# Patient Record
Sex: FEMALE | Race: WHITE | NOT HISPANIC OR LATINO | URBAN - NONMETROPOLITAN AREA
[De-identification: names, ages, dates, MRNs, and addresses within clinical notes are randomized per-mention and may not be internally consistent; named-entity substitution may affect disease eponyms.]

---

## 2020-07-20 ENCOUNTER — OFFICE VISIT (OUTPATIENT)
Dept: URBAN - NONMETROPOLITAN AREA CLINIC 2 | Facility: CLINIC | Age: 65
End: 2020-07-20
Payer: COMMERCIAL

## 2020-07-20 ENCOUNTER — LAB OUTSIDE AN ENCOUNTER (OUTPATIENT)
Dept: URBAN - NONMETROPOLITAN AREA CLINIC 2 | Facility: CLINIC | Age: 65
End: 2020-07-20

## 2020-07-20 ENCOUNTER — WEB ENCOUNTER (OUTPATIENT)
Dept: URBAN - NONMETROPOLITAN AREA CLINIC 2 | Facility: CLINIC | Age: 65
End: 2020-07-20

## 2020-07-20 DIAGNOSIS — Z12.11 ROUTINE COLON: ICD-10-CM

## 2020-07-20 DIAGNOSIS — K21.9 ESOPHAGEAL REFLUX: ICD-10-CM

## 2020-07-20 DIAGNOSIS — D50.9 IDA (IRON DEFICIENCY ANEMIA): ICD-10-CM

## 2020-07-20 PROCEDURE — 99204 OFFICE O/P NEW MOD 45 MIN: CPT | Performed by: NURSE PRACTITIONER

## 2020-07-20 RX ORDER — ESCITALOPRAM 20 MG/1
1 TABLET TABLET, FILM COATED ORAL ONCE A DAY
Status: ACTIVE | COMMUNITY

## 2020-07-20 RX ORDER — ROSUVASTATIN CALCIUM 10 MG/1
1 TABLET TABLET, FILM COATED ORAL QD
Refills: 0 | Status: ACTIVE | COMMUNITY
Start: 2015-10-02

## 2020-07-20 RX ORDER — CA/D3/MAG OX/ZINC/COP/MANG/BOR 600 MG-800
TAKE 1 CAPSULE BY ORAL ROUTE DAILY TABLET,CHEWABLE ORAL QD
Refills: 0 | Status: ACTIVE | COMMUNITY
Start: 2015-10-02

## 2020-07-20 RX ORDER — LISINOPRIL AND HYDROCHLOROTHIAZIDE 20; 12.5 MG/1; MG/1
1 TABLET TABLET ORAL ONCE A DAY
Status: ACTIVE | COMMUNITY

## 2020-07-20 RX ORDER — POLYETHYLENE GLYCOL 3350, SODIUM SULFATE, SODIUM CHLORIDE, POTASSIUM CHLORIDE, ASCORBIC ACID, SODIUM ASCORBATE 140-9-5.2G
AS DIRECTED KIT ORAL ONCE
Qty: 280 GRAM | Refills: 0 | OUTPATIENT
Start: 2020-07-20 | End: 2020-07-21

## 2020-07-20 RX ORDER — MELOXICAM 7.5 MG/1
1 TABLET TABLET ORAL PRN
Refills: 0 | Status: ACTIVE | COMMUNITY
Start: 2015-10-02

## 2020-07-20 NOTE — HPI-TODAY'S VISIT:
Mrs. Smith presents for evaluation of colon cancer screening and GERD. Her last EGD and Colonoscopy were in 2015 by Dr. Steve with reflux esophagitis and two SSA. She is due for repeat colonoscopy. She is on omeprazole 20mg daily and NSAIDs 2-3 times a week for arthritis. She wants to wean off but can't. She does have breakthrough reflux occasionally throughout the week. Today she is doing well otherwise. MB

## 2020-08-12 ENCOUNTER — OFFICE VISIT (OUTPATIENT)
Dept: URBAN - NONMETROPOLITAN AREA SURGERY CENTER 1 | Facility: SURGERY CENTER | Age: 65
End: 2020-08-12
Payer: COMMERCIAL

## 2020-08-12 DIAGNOSIS — D50.8 ANEMIA, DUE TO INADEQUATE IRON INTAKE: ICD-10-CM

## 2020-08-12 DIAGNOSIS — K22.8 COLUMNAR-LINED ESOPHAGUS: ICD-10-CM

## 2020-08-12 DIAGNOSIS — K44.9 DIAPHRAGMATIC HERNIA: ICD-10-CM

## 2020-08-12 DIAGNOSIS — K31.89 ACQUIRED DEFORMITY OF PYLORUS: ICD-10-CM

## 2020-08-12 PROCEDURE — 43239 EGD BIOPSY SINGLE/MULTIPLE: CPT | Performed by: INTERNAL MEDICINE

## 2020-08-12 PROCEDURE — G9937 DIG OR SURV COLSCO: HCPCS | Performed by: INTERNAL MEDICINE

## 2020-08-12 PROCEDURE — G8907 PT DOC NO EVENTS ON DISCHARG: HCPCS | Performed by: INTERNAL MEDICINE

## 2020-08-12 PROCEDURE — 45378 DIAGNOSTIC COLONOSCOPY: CPT | Performed by: INTERNAL MEDICINE

## 2020-08-26 ENCOUNTER — OFFICE VISIT (OUTPATIENT)
Dept: URBAN - METROPOLITAN AREA TELEHEALTH 2 | Facility: TELEHEALTH | Age: 65
End: 2020-08-26
Payer: COMMERCIAL

## 2020-08-26 DIAGNOSIS — Z12.11 ROUTINE COLON: ICD-10-CM

## 2020-08-26 DIAGNOSIS — D50.8 OTHER IRON DEFICIENCY ANEMIAS: ICD-10-CM

## 2020-08-26 DIAGNOSIS — K21.9 ESOPHAGEAL REFLUX: ICD-10-CM

## 2020-08-26 PROCEDURE — G8427 DOCREV CUR MEDS BY ELIG CLIN: HCPCS | Performed by: NURSE PRACTITIONER

## 2020-08-26 PROCEDURE — 3017F COLORECTAL CA SCREEN DOC REV: CPT | Performed by: NURSE PRACTITIONER

## 2020-08-26 PROCEDURE — G9903 PT SCRN TBCO ID AS NON USER: HCPCS | Performed by: NURSE PRACTITIONER

## 2020-08-26 PROCEDURE — 99213 OFFICE O/P EST LOW 20 MIN: CPT | Performed by: NURSE PRACTITIONER

## 2020-08-26 PROCEDURE — 1036F TOBACCO NON-USER: CPT | Performed by: NURSE PRACTITIONER

## 2020-08-26 RX ORDER — CA/D3/MAG OX/ZINC/COP/MANG/BOR 600 MG-800
TAKE 1 CAPSULE BY ORAL ROUTE DAILY TABLET,CHEWABLE ORAL QD
Refills: 0 | Status: ACTIVE | COMMUNITY
Start: 2015-10-02

## 2020-08-26 RX ORDER — LISINOPRIL AND HYDROCHLOROTHIAZIDE 20; 12.5 MG/1; MG/1
1 TABLET TABLET ORAL ONCE A DAY
Status: ACTIVE | COMMUNITY

## 2020-08-26 RX ORDER — ROSUVASTATIN CALCIUM 10 MG/1
1 TABLET TABLET, FILM COATED ORAL QD
Refills: 0 | Status: ACTIVE | COMMUNITY
Start: 2015-10-02

## 2020-08-26 RX ORDER — MELOXICAM 7.5 MG/1
1 TABLET TABLET ORAL PRN
Refills: 0 | Status: ACTIVE | COMMUNITY
Start: 2015-10-02

## 2020-08-26 RX ORDER — ESCITALOPRAM 20 MG/1
1 TABLET TABLET, FILM COATED ORAL ONCE A DAY
Status: ACTIVE | COMMUNITY

## 2020-08-26 NOTE — PHYSICAL EXAM HENT:
Head,  normocephalic,  atraumatic,  Face,  Face within normal limits,  Ears,  External ears within normal limits,  Nose/Nasopharynx,  External nose  normal appearance,  nares patent,  no nasal discharge,  Mouth and Throat,  Oral cavity appearance normal,  Lips,  Appearance normal , Head,  normocephalic,  atraumatic,  Face,  Face within normal limits,  Ears,  External ears within normal limits,  Nose/Nasopharynx,  External nose  normal appearance,  nares patent,  no nasal discharge,  Mouth and Throat,  Oral cavity appearance normal,  Lips,  Appearance normal

## 2020-08-26 NOTE — PHYSICAL EXAM GASTROINTESTINAL
Abdomen Self Exam, soft, nontender, nondistended , no masses palpable , Abdomen Self Exam, soft, nontender, nondistended , no masses palpable

## 2020-08-26 NOTE — PHYSICAL EXAM NECK/THYROID:
normal appearance , no deformities , trachea midline , normal appearance , no deformities , trachea midline

## 2020-08-26 NOTE — PHYSICAL EXAM CONSTITUTIONAL:
well developed, well nourished , in no acute distress, normal communication ability , well developed, well nourished , in no acute distress, normal communication ability

## 2020-08-26 NOTE — PHYSICAL EXAM NEUROLOGIC:
oriented to person, place and time , normal mood with an appropriate affect , oriented to person, place and time , normal mood with an appropriate affect

## 2021-02-25 ENCOUNTER — OFFICE VISIT (OUTPATIENT)
Dept: URBAN - METROPOLITAN AREA TELEHEALTH 2 | Facility: TELEHEALTH | Age: 66
End: 2021-02-25
Payer: COMMERCIAL

## 2021-02-25 DIAGNOSIS — K21.9 ESOPHAGEAL REFLUX: ICD-10-CM

## 2021-02-25 DIAGNOSIS — D50.8 OTHER IRON DEFICIENCY ANEMIA: ICD-10-CM

## 2021-02-25 PROCEDURE — 99213 OFFICE O/P EST LOW 20 MIN: CPT | Performed by: INTERNAL MEDICINE

## 2021-02-25 RX ORDER — MELOXICAM 7.5 MG/1
1 TABLET TABLET ORAL PRN
Refills: 0 | Status: ACTIVE | COMMUNITY
Start: 2015-10-02

## 2021-02-25 RX ORDER — CA/D3/MAG OX/ZINC/COP/MANG/BOR 600 MG-800
TAKE 1 CAPSULE BY ORAL ROUTE DAILY TABLET,CHEWABLE ORAL QD
Refills: 0 | Status: ACTIVE | COMMUNITY
Start: 2015-10-02

## 2021-02-25 RX ORDER — LISINOPRIL AND HYDROCHLOROTHIAZIDE 20; 12.5 MG/1; MG/1
1 TABLET TABLET ORAL ONCE A DAY
Status: ACTIVE | COMMUNITY

## 2021-02-25 RX ORDER — ROSUVASTATIN CALCIUM 10 MG/1
1 TABLET TABLET, FILM COATED ORAL QD
Refills: 0 | Status: ACTIVE | COMMUNITY
Start: 2015-10-02

## 2021-02-25 RX ORDER — ESCITALOPRAM 20 MG/1
1 TABLET TABLET, FILM COATED ORAL ONCE A DAY
Status: ACTIVE | COMMUNITY

## 2021-02-25 NOTE — HPI-TODAY'S VISIT:
Mrs. Smith presents for evaluation of colon cancer screening and GERD. Her last EGD and Colonoscopy were in 2015 by Dr. Steve with reflux esophagitis and two SSA. She is due for repeat colonoscopy. She is on omeprazole 20mg daily and NSAIDs 2-3 times a week for arthritis. She wants to wean off but can't. She does have breakthrough reflux occasionally throughout the week. Today she is doing well otherwise. MB  8/26/2020 Mrs. Smith presents for endoscopy follow up. Her EGD reveals 2 cm hiatal hernia and mild esophagitis, her biopsies are pending. Her colonsocpy is normal other than diverticular disease. She is on low dose PPI daily and pepcid as needed. Her bowels are moving daily on low dose fiber. Today she is doing well otherwise. MB   2/24/2021 Mrs. Smith presents for follow up of reflux and GANGA. She is doing great on omeprazole 20mg, she is not requiring pepcid as needed. Her bowels are normal. Today she is doing well. MB

## 2023-10-17 ENCOUNTER — OFFICE VISIT (OUTPATIENT)
Dept: URBAN - NONMETROPOLITAN AREA CLINIC 2 | Facility: CLINIC | Age: 68
End: 2023-10-17
Payer: COMMERCIAL

## 2023-10-17 ENCOUNTER — LAB OUTSIDE AN ENCOUNTER (OUTPATIENT)
Dept: URBAN - NONMETROPOLITAN AREA CLINIC 2 | Facility: CLINIC | Age: 68
End: 2023-10-17

## 2023-10-17 VITALS
SYSTOLIC BLOOD PRESSURE: 129 MMHG | WEIGHT: 225 LBS | HEART RATE: 72 BPM | HEIGHT: 64 IN | TEMPERATURE: 98.6 F | DIASTOLIC BLOOD PRESSURE: 78 MMHG | BODY MASS INDEX: 38.41 KG/M2

## 2023-10-17 DIAGNOSIS — K44.9 HIATAL HERNIA: ICD-10-CM

## 2023-10-17 DIAGNOSIS — D50.9 IDA (IRON DEFICIENCY ANEMIA): ICD-10-CM

## 2023-10-17 DIAGNOSIS — K57.90 DIVERTICULOSIS: ICD-10-CM

## 2023-10-17 DIAGNOSIS — Z86.010 PERSONAL HISTORY OF COLONIC POLYPS: ICD-10-CM

## 2023-10-17 DIAGNOSIS — K21.9 ESOPHAGEAL REFLUX: ICD-10-CM

## 2023-10-17 DIAGNOSIS — R19.4 CHANGE IN BOWEL HABITS: ICD-10-CM

## 2023-10-17 PROBLEM — 84089009: Status: ACTIVE | Noted: 2023-10-17

## 2023-10-17 PROBLEM — 397881000: Status: ACTIVE | Noted: 2023-10-17

## 2023-10-17 PROBLEM — 88111009: Status: ACTIVE | Noted: 2023-10-17

## 2023-10-17 PROBLEM — 428283002: Status: ACTIVE | Noted: 2023-10-17

## 2023-10-17 PROCEDURE — 99214 OFFICE O/P EST MOD 30 MIN: CPT | Performed by: NURSE PRACTITIONER

## 2023-10-17 RX ORDER — CA/D3/MAG OX/ZINC/COP/MANG/BOR 600 MG-800
TAKE 1 CAPSULE BY ORAL ROUTE DAILY TABLET,CHEWABLE ORAL QD
Refills: 0 | Status: ON HOLD | COMMUNITY
Start: 2015-10-02

## 2023-10-17 RX ORDER — LOSARTAN POTASSIUM 50 MG/1
1 TABLET TABLET ORAL ONCE A DAY
Status: ACTIVE | COMMUNITY

## 2023-10-17 RX ORDER — ROSUVASTATIN CALCIUM 10 MG/1
1 TABLET TABLET, FILM COATED ORAL QD
Refills: 0 | Status: ACTIVE | COMMUNITY
Start: 2015-10-02

## 2023-10-17 RX ORDER — LISINOPRIL AND HYDROCHLOROTHIAZIDE 20; 12.5 MG/1; MG/1
1 TABLET TABLET ORAL ONCE A DAY
Status: ON HOLD | COMMUNITY

## 2023-10-17 RX ORDER — ESCITALOPRAM 20 MG/1
1 TABLET TABLET, FILM COATED ORAL ONCE A DAY
Status: ON HOLD | COMMUNITY

## 2023-10-17 RX ORDER — MELOXICAM 7.5 MG/1
1 TABLET TABLET ORAL PRN
Refills: 0 | Status: ON HOLD | COMMUNITY
Start: 2015-10-02

## 2023-10-17 NOTE — HPI-TODAY'S VISIT:
10/17/2023 Leticia presents for evaluation of change in bowel habits.  Since her last visit she status post EGD and colonoscopy in 2020 with reflux and no polyps and fair prep.  She recently started metformin for prediabetes.  She developed severe abdominal pain and diarrhea and was treated for diverticulitis.  She did not have any labs or imaging done.  She was treated empirically by her primary care physician.  Subsequently she developed constipation.  She took Metamucil and Colace initially with some relief but she had recurrent symptoms.  She then added MiraLAX daily which caused loose stools.  Today she agrees to start 2 capsules of Metamucil at night and a half of a capful of MiraLAX daily, if no relief with schedule repeat colonoscopy given her change in bowel habits.  Consider labs and CT imaging if she has another acute flare of lower abdominal pain.  MB

## 2024-01-24 ENCOUNTER — OFFICE VISIT (OUTPATIENT)
Dept: URBAN - NONMETROPOLITAN AREA SURGERY CENTER 1 | Facility: SURGERY CENTER | Age: 69
End: 2024-01-24
Payer: COMMERCIAL

## 2024-01-24 ENCOUNTER — CLAIMS CREATED FROM THE CLAIM WINDOW (OUTPATIENT)
Dept: URBAN - METROPOLITAN AREA CLINIC 4 | Facility: CLINIC | Age: 69
End: 2024-01-24
Payer: COMMERCIAL

## 2024-01-24 DIAGNOSIS — R19.4 CHANGE IN BOWEL HABITS: ICD-10-CM

## 2024-01-24 DIAGNOSIS — Z86.010 ADENOMAS PERSONAL HISTORY OF COLONIC POLYPS: ICD-10-CM

## 2024-01-24 DIAGNOSIS — K57.30 DIVERTICULOSIS OF COLON: ICD-10-CM

## 2024-01-24 DIAGNOSIS — D12.2 BENIGN NEOPLASM OF ASCENDING COLON: ICD-10-CM

## 2024-01-24 DIAGNOSIS — Z09 ENCOUNTER FOR COLONOSCOPY FOLLOWING COLON POLYP REMOVAL: ICD-10-CM

## 2024-01-24 DIAGNOSIS — D12.4 ADENOMA OF DESCENDING COLON: ICD-10-CM

## 2024-01-24 DIAGNOSIS — D12.2 ADENOMA OF ASCENDING COLON: ICD-10-CM

## 2024-01-24 DIAGNOSIS — Z86.010 PERSONAL HISTORY OF COLON POLYPS: ICD-10-CM

## 2024-01-24 PROCEDURE — 00811 ANES LWR INTST NDSC NOS: CPT | Performed by: NURSE ANESTHETIST, CERTIFIED REGISTERED

## 2024-01-24 PROCEDURE — 45385 COLONOSCOPY W/LESION REMOVAL: CPT | Performed by: INTERNAL MEDICINE

## 2024-01-24 PROCEDURE — 88305 TISSUE EXAM BY PATHOLOGIST: CPT | Performed by: PATHOLOGY

## 2024-01-24 PROCEDURE — G8907 PT DOC NO EVENTS ON DISCHARG: HCPCS | Performed by: INTERNAL MEDICINE

## 2024-03-08 ENCOUNTER — OV EP (OUTPATIENT)
Dept: URBAN - NONMETROPOLITAN AREA CLINIC 2 | Facility: CLINIC | Age: 69
End: 2024-03-08
Payer: COMMERCIAL

## 2024-03-08 VITALS
DIASTOLIC BLOOD PRESSURE: 82 MMHG | WEIGHT: 216 LBS | HEIGHT: 64 IN | BODY MASS INDEX: 36.88 KG/M2 | HEART RATE: 85 BPM | SYSTOLIC BLOOD PRESSURE: 113 MMHG | TEMPERATURE: 98.6 F

## 2024-03-08 DIAGNOSIS — K57.90 DIVERTICULOSIS: ICD-10-CM

## 2024-03-08 DIAGNOSIS — K21.9 ESOPHAGEAL REFLUX: ICD-10-CM

## 2024-03-08 DIAGNOSIS — K64.9 HEMORRHOIDS, UNSPECIFIED HEMORRHOID TYPE: ICD-10-CM

## 2024-03-08 DIAGNOSIS — Z86.010 PERSONAL HISTORY OF COLONIC POLYPS: ICD-10-CM

## 2024-03-08 DIAGNOSIS — R19.4 CHANGE IN BOWEL HABITS: ICD-10-CM

## 2024-03-08 DIAGNOSIS — D50.9 IDA (IRON DEFICIENCY ANEMIA): ICD-10-CM

## 2024-03-08 DIAGNOSIS — K44.9 HIATAL HERNIA: ICD-10-CM

## 2024-03-08 PROBLEM — 70153002: Status: ACTIVE | Noted: 2024-03-08

## 2024-03-08 PROCEDURE — 99214 OFFICE O/P EST MOD 30 MIN: CPT | Performed by: NURSE PRACTITIONER

## 2024-03-08 RX ORDER — ESCITALOPRAM 20 MG/1
1 TABLET TABLET, FILM COATED ORAL ONCE A DAY
Status: ON HOLD | COMMUNITY

## 2024-03-08 RX ORDER — LISINOPRIL AND HYDROCHLOROTHIAZIDE 20; 12.5 MG/1; MG/1
1 TABLET TABLET ORAL ONCE A DAY
Status: ON HOLD | COMMUNITY

## 2024-03-08 RX ORDER — ROSUVASTATIN CALCIUM 10 MG/1
1 TABLET TABLET, FILM COATED ORAL QD
Refills: 0 | Status: ACTIVE | COMMUNITY
Start: 2015-10-02

## 2024-03-08 RX ORDER — LOSARTAN POTASSIUM 50 MG/1
1 TABLET TABLET ORAL ONCE A DAY
Status: ACTIVE | COMMUNITY

## 2024-03-08 RX ORDER — CA/D3/MAG OX/ZINC/COP/MANG/BOR 600 MG-800
TAKE 1 CAPSULE BY ORAL ROUTE DAILY TABLET,CHEWABLE ORAL QD
Refills: 0 | Status: ON HOLD | COMMUNITY
Start: 2015-10-02

## 2024-03-08 RX ORDER — MELOXICAM 7.5 MG/1
1 TABLET TABLET ORAL PRN
Refills: 0 | Status: ON HOLD | COMMUNITY
Start: 2015-10-02

## 2024-03-08 NOTE — HPI-TODAY'S VISIT:
10/17/2023 Leticia presents for evaluation of change in bowel habits.  Since her last visit she status post EGD and colonoscopy in 2020 with reflux and no polyps and fair prep.  She recently started metformin for prediabetes.  She developed severe abdominal pain and diarrhea and was treated for diverticulitis.  She did not have any labs or imaging done.  She was treated empirically by her primary care physician.  Subsequently she developed constipation.  She took Metamucil and Colace initially with some relief but she had recurrent symptoms.  She then added MiraLAX daily which caused loose stools.  Today she agrees to start 2 capsules of Metamucil at night and a half of a capful of MiraLAX daily, if no relief with schedule repeat colonoscopy given her change in bowel habits.  Consider labs and CT imaging if she has another acute flare of lower abdominal pain.  MB 3/8/2024 Mrs. Smith presents for colonoscopy follow-up.  Her colonoscopy revealed 4 small tubular adenomas/SSA along with diverticular disease.  She is on psyllium capsules 2 twice daily.  She only has to take the MiraLAX occasionally.  Her main complaint is external hemorrhoid.  She is using Tucks pads and Preparation H.  We have discussed adding sitz bath.  She would like to see colorectal surgery.  MB

## 2024-09-20 ENCOUNTER — DASHBOARD ENCOUNTERS (OUTPATIENT)
Age: 69
End: 2024-09-20

## 2024-09-20 ENCOUNTER — OFFICE VISIT (OUTPATIENT)
Dept: URBAN - NONMETROPOLITAN AREA CLINIC 2 | Facility: CLINIC | Age: 69
End: 2024-09-20
Payer: COMMERCIAL

## 2024-09-20 VITALS
DIASTOLIC BLOOD PRESSURE: 80 MMHG | HEART RATE: 73 BPM | WEIGHT: 215 LBS | HEIGHT: 64 IN | SYSTOLIC BLOOD PRESSURE: 123 MMHG | BODY MASS INDEX: 36.7 KG/M2

## 2024-09-20 DIAGNOSIS — K21.9 ESOPHAGEAL REFLUX: ICD-10-CM

## 2024-09-20 DIAGNOSIS — D50.8 OTHER IRON DEFICIENCY ANEMIA: ICD-10-CM

## 2024-09-20 PROCEDURE — 99214 OFFICE O/P EST MOD 30 MIN: CPT | Performed by: NURSE PRACTITIONER

## 2024-09-20 RX ORDER — OMEPRAZOLE 10 MG/1
1 CAPSULE CAPSULE, DELAYED RELEASE ORAL TWICE DAILY
Qty: 90 CAPSULES | Refills: 3
Start: 2015-10-02

## 2024-09-20 RX ORDER — CA/D3/MAG OX/ZINC/COP/MANG/BOR 600 MG-800
TAKE 1 CAPSULE BY ORAL ROUTE DAILY TABLET,CHEWABLE ORAL QD
Refills: 0 | Status: ON HOLD | COMMUNITY
Start: 2015-10-02

## 2024-09-20 RX ORDER — LISINOPRIL AND HYDROCHLOROTHIAZIDE 20; 12.5 MG/1; MG/1
1 TABLET TABLET ORAL ONCE A DAY
Status: ON HOLD | COMMUNITY

## 2024-09-20 RX ORDER — ROSUVASTATIN CALCIUM 10 MG/1
1 TABLET TABLET, FILM COATED ORAL QD
Refills: 0 | Status: ACTIVE | COMMUNITY
Start: 2015-10-02

## 2024-09-20 RX ORDER — LOSARTAN POTASSIUM 50 MG/1
1 TABLET TABLET ORAL ONCE A DAY
Status: ACTIVE | COMMUNITY

## 2024-09-20 RX ORDER — MELOXICAM 7.5 MG/1
1 TABLET TABLET ORAL PRN
Refills: 0 | Status: ON HOLD | COMMUNITY
Start: 2015-10-02

## 2024-09-20 RX ORDER — ESCITALOPRAM 20 MG/1
1 TABLET TABLET, FILM COATED ORAL ONCE A DAY
Status: ON HOLD | COMMUNITY

## 2024-09-20 NOTE — HPI-TODAY'S VISIT:
10/17/2023 Leticia presents for evaluation of change in bowel habits.  Since her last visit she status post EGD and colonoscopy in 2020 with reflux and no polyps and fair prep.  She recently started metformin for prediabetes.  She developed severe abdominal pain and diarrhea and was treated for diverticulitis.  She did not have any labs or imaging done.  She was treated empirically by her primary care physician.  Subsequently she developed constipation.  She took Metamucil and Colace initially with some relief but she had recurrent symptoms.  She then added MiraLAX daily which caused loose stools.  Today she agrees to start 2 capsules of Metamucil at night and a half of a capful of MiraLAX daily, if no relief with schedule repeat colonoscopy given her change in bowel habits.  Consider labs and CT imaging if she has another acute flare of lower abdominal pain.  MB 3/8/2024 Mrs. Smith presents for colonoscopy follow-up.  Her colonoscopy revealed 4 small tubular adenomas/SSA along with diverticular disease.  She is on psyllium capsules 2 twice daily.  She only has to take the MiraLAX occasionally.  Her main complaint is external hemorrhoid.  She is using Tucks pads and Preparation H.  We have discussed adding sitz bath.  She would like to see colorectal surgery.  MB 9/20/2024 Leticia presents for follow-up.  She is doing great on omeprazole 20 mg daily.  Her bowels are moving great on psyllium 2 capsules twice daily, she did not follow-up with Dr. SMITH but her hemorrhoids are stable.  She recently reports increased fatigue.  She had her ferritin drawn by her PCP which was low.  She is on oral iron every other day.  Her EGD in 2020 shows mild reflux and gastritis normal small bowel biopsies and 2 cm hiatal hernia, her colonoscopy in January 2024 shows 4 polyps.  She denies any signs or symptoms of GI bleeding.  Today we will repeat her labs, she has been on oral iron for 2 months, if she is still anemic or iron deficient consider repeat EGD, otherwise try to wean omeprazole to 10 mg daily and continue psyllium.  MB

## 2024-09-21 LAB
ABSOLUTE BASOPHILS: 38
ABSOLUTE EOSINOPHILS: 119
ABSOLUTE LYMPHOCYTES: 1507
ABSOLUTE MONOCYTES: 389
ABSOLUTE NEUTROPHILS: 3348
BASOPHILS: 0.7
EOSINOPHILS: 2.2
FERRITIN, SERUM: 18
HEMATOCRIT: 39.1
HEMOGLOBIN: 12.3
IRON BIND.CAP.(TIBC): 327
IRON SATURATION: 19
IRON: 61
LYMPHOCYTES: 27.9
MCH: 26.5
MCHC: 31.5
MCV: 84.1
MONOCYTES: 7.2
MPV: 10.1
NEUTROPHILS: 62
PLATELET COUNT: 256
RDW: 14.9
RED BLOOD CELL COUNT: 4.65
WHITE BLOOD CELL COUNT: 5.4

## 2025-03-21 ENCOUNTER — OFFICE VISIT (OUTPATIENT)
Dept: URBAN - NONMETROPOLITAN AREA CLINIC 2 | Facility: CLINIC | Age: 70
End: 2025-03-21
Payer: COMMERCIAL

## 2025-03-21 DIAGNOSIS — K44.9 HIATAL HERNIA: ICD-10-CM

## 2025-03-21 DIAGNOSIS — K59.00 CONSTIPATION, UNSPECIFIED CONSTIPATION TYPE: ICD-10-CM

## 2025-03-21 DIAGNOSIS — D50.9 IDA (IRON DEFICIENCY ANEMIA): ICD-10-CM

## 2025-03-21 DIAGNOSIS — K57.90 DIVERTICULOSIS: ICD-10-CM

## 2025-03-21 DIAGNOSIS — K64.9 HEMORRHOIDS, UNSPECIFIED HEMORRHOID TYPE: ICD-10-CM

## 2025-03-21 DIAGNOSIS — K21.9 ESOPHAGEAL REFLUX: ICD-10-CM

## 2025-03-21 DIAGNOSIS — Z86.0101 PERSONAL HISTORY OF ADENOMATOUS AND SERRATED COLON POLYPS: ICD-10-CM

## 2025-03-21 PROBLEM — 428283002: Status: ACTIVE | Noted: 2025-03-21

## 2025-03-21 PROBLEM — 14760008: Status: ACTIVE | Noted: 2025-03-21

## 2025-03-21 PROCEDURE — 99214 OFFICE O/P EST MOD 30 MIN: CPT | Performed by: NURSE PRACTITIONER

## 2025-03-21 RX ORDER — CA/D3/MAG OX/ZINC/COP/MANG/BOR 600 MG-800
TAKE 1 CAPSULE BY ORAL ROUTE DAILY TABLET,CHEWABLE ORAL QD
Refills: 0 | Status: ON HOLD | COMMUNITY
Start: 2015-10-02

## 2025-03-21 RX ORDER — LOSARTAN POTASSIUM 50 MG/1
1 TABLET TABLET ORAL ONCE A DAY
Status: ACTIVE | COMMUNITY

## 2025-03-21 RX ORDER — SEMAGLUTIDE 0.68 MG/ML
AS DIRECTED INJECTION, SOLUTION SUBCUTANEOUS
Status: ACTIVE | COMMUNITY

## 2025-03-21 RX ORDER — ESCITALOPRAM 20 MG/1
1 TABLET TABLET, FILM COATED ORAL ONCE A DAY
Status: ON HOLD | COMMUNITY

## 2025-03-21 RX ORDER — CICLOPIROX OLAMINE 7.7 MG/G
1 APPLICATION CREAM TOPICAL TWICE A DAY
Status: ACTIVE | COMMUNITY

## 2025-03-21 RX ORDER — OMEPRAZOLE 10 MG/1
1 CAPSULE CAPSULE, DELAYED RELEASE ORAL TWICE DAILY
Qty: 90 CAPSULES | Refills: 3 | Status: ACTIVE | COMMUNITY
Start: 2015-10-02

## 2025-03-21 RX ORDER — LISINOPRIL AND HYDROCHLOROTHIAZIDE 20; 12.5 MG/1; MG/1
1 TABLET TABLET ORAL ONCE A DAY
Status: ON HOLD | COMMUNITY

## 2025-03-21 RX ORDER — MELOXICAM 7.5 MG/1
1 TABLET TABLET ORAL PRN
Refills: 0 | Status: ON HOLD | COMMUNITY
Start: 2015-10-02

## 2025-03-21 RX ORDER — ROSUVASTATIN CALCIUM 10 MG/1
1 TABLET TABLET, FILM COATED ORAL QD
Refills: 0 | Status: ACTIVE | COMMUNITY
Start: 2015-10-02

## 2025-03-21 NOTE — HPI-TODAY'S VISIT:
10/17/2023 Leticia presents for evaluation of change in bowel habits.  Since her last visit she status post EGD and colonoscopy in 2020 with reflux and no polyps and fair prep.  She recently started metformin for prediabetes.  She developed severe abdominal pain and diarrhea and was treated for diverticulitis.  She did not have any labs or imaging done.  She was treated empirically by her primary care physician.  Subsequently she developed constipation.  She took Metamucil and Colace initially with some relief but she had recurrent symptoms.  She then added MiraLAX daily which caused loose stools.  Today she agrees to start 2 capsules of Metamucil at night and a half of a capful of MiraLAX daily, if no relief with schedule repeat colonoscopy given her change in bowel habits.  Consider labs and CT imaging if she has another acute flare of lower abdominal pain.  MB 3/8/2024 Mrs. Smith presents for colonoscopy follow-up.  Her colonoscopy revealed 4 small tubular adenomas/SSA along with diverticular disease.  She is on psyllium capsules 2 twice daily.  She only has to take the MiraLAX occasionally.  Her main complaint is external hemorrhoid.  She is using Tucks pads and Preparation H.  We have discussed adding sitz bath.  She would like to see colorectal surgery.  MB 9/20/2024 Leticia presents for follow-up.  She is doing great on omeprazole 20 mg daily.  Her bowels are moving great on psyllium 2 capsules twice daily, she did not follow-up with Dr. SMITH but her hemorrhoids are stable.  She recently reports increased fatigue.  She had her ferritin drawn by her PCP which was low.  She is on oral iron every other day.  Her EGD in 2020 shows mild reflux and gastritis normal small bowel biopsies and 2 cm hiatal hernia, her colonoscopy in January 2024 shows 4 polyps.  She denies any signs or symptoms of GI bleeding.  Today we will repeat her labs, she has been on oral iron for 2 months, if she is still anemic or iron deficient consider repeat EGD, otherwise try to wean omeprazole to 10 mg daily and continue psyllium.  MB 3/21/2025 Leticia presents for follow-up.  Since her last visit she has been doing well.  She has started GLP-1 therapy, I do want her to add a half a capful of MiraLAX daily to avoid constipation.  Her blood counts have been normal however her iron studies showed iron deficiency earlier this year, she is restarted iron therapy.  We will recheck a CBC today to ensure no significant anemia, if so we will proceed with EGD.  Her reflux is stable on omeprazole 10 mg daily, okay to add 20 mg regularly of famotidine at night.  MB

## 2025-03-22 LAB
ABSOLUTE BASOPHILS: 62
ABSOLUTE EOSINOPHILS: 118
ABSOLUTE LYMPHOCYTES: 1829
ABSOLUTE MONOCYTES: 508
ABSOLUTE NEUTROPHILS: 3683
BASOPHILS: 1
EOSINOPHILS: 1.9
HEMATOCRIT: 41.5
HEMOGLOBIN: 12.9
LYMPHOCYTES: 29.5
MCH: 26
MCHC: 31.1
MCV: 83.5
MONOCYTES: 8.2
MPV: 10.4
NEUTROPHILS: 59.4
PLATELET COUNT: 275
RDW: 14.5
RED BLOOD CELL COUNT: 4.97
WHITE BLOOD CELL COUNT: 6.2

## 2025-03-24 ENCOUNTER — TELEPHONE ENCOUNTER (OUTPATIENT)
Dept: URBAN - NONMETROPOLITAN AREA CLINIC 2 | Facility: CLINIC | Age: 70
End: 2025-03-24